# Patient Record
Sex: FEMALE | Race: BLACK OR AFRICAN AMERICAN | Employment: FULL TIME | URBAN - METROPOLITAN AREA
[De-identification: names, ages, dates, MRNs, and addresses within clinical notes are randomized per-mention and may not be internally consistent; named-entity substitution may affect disease eponyms.]

---

## 2023-06-23 ENCOUNTER — HOSPITAL ENCOUNTER (EMERGENCY)
Age: 23
Discharge: HOME | End: 2023-06-23
Attending: EMERGENCY MEDICINE
Payer: COMMERCIAL

## 2023-06-23 VITALS
OXYGEN SATURATION: 98 % | HEIGHT: 65 IN | RESPIRATION RATE: 16 BRPM | WEIGHT: 125 LBS | TEMPERATURE: 97.4 F | SYSTOLIC BLOOD PRESSURE: 123 MMHG | HEART RATE: 96 BPM | BODY MASS INDEX: 20.83 KG/M2 | DIASTOLIC BLOOD PRESSURE: 65 MMHG

## 2023-06-23 DIAGNOSIS — J01.00 ACUTE NON-RECURRENT MAXILLARY SINUSITIS: Primary | ICD-10-CM

## 2023-06-23 PROCEDURE — 99283 EMERGENCY DEPT VISIT LOW MDM: CPT

## 2023-06-23 RX ORDER — AMOXICILLIN AND CLAVULANATE POTASSIUM 875; 125 MG/1; MG/1
1 TABLET, FILM COATED ORAL 2 TIMES DAILY
Qty: 14 TABLET | Refills: 0 | Status: SHIPPED | OUTPATIENT
Start: 2023-06-23 | End: 2023-06-30

## 2023-06-23 ASSESSMENT — PAIN - FUNCTIONAL ASSESSMENT: PAIN_FUNCTIONAL_ASSESSMENT: NONE - DENIES PAIN

## 2023-06-23 NOTE — ED PROVIDER NOTES
Emergency Department Provider Note       PCP: No primary care provider on file. Age: 20 y.o. Sex: female     DISPOSITION Decision To Discharge 06/23/2023 03:09:30 PM       ICD-10-CM    1. Acute non-recurrent maxillary sinusitis  J01.00           Medical Decision Making     Complexity of Problems Addressed:  Complexity of Problem: 1 acute, uncomplicated illness or injury. Data Reviewed and Analyzed:  Category 1:   I independently ordered and reviewed each unique test.         Category 2:       Category 3: Discussion of management or test interpretation. Overall well-appearing 69-year-old female presents emergency department today with complaint of a sinus infection. Patient appears in no acute distress. Maxillary sinus tenderness on exam but otherwise unremarkable exam.  Patient was unsure if she was pregnant. Urine pregnancy is negative. We will treat with Augmentin. Return precautions discussed. Risk of Complications and/or Morbidity of Patient Management:  Prescription drug management performed and Shared medical decision making was utilized in creating the patients health plan today. History     Angeline Wagner is a 20 y.o. female who presents to the Emergency Department with chief complaint of    Chief Complaint   Patient presents with    Sinus Problem      69-year-old female presents emergency department today with complaint of sinus infection. Patient reports that she began with congestion about 8 days ago. Symptoms have progressively worsened. She denies any fever, sore throat, cough, or ear pain. She has been taking Zyrtec and Claritin without relief. She reports that she does get sinus infections once or twice a year. She denies any recent antibiotic use. The history is provided by the patient. Physical Exam     Vitals signs and nursing note reviewed.    Vitals:    06/23/23 1458   BP: 123/65   Pulse: 96   Resp: 16   Temp: 97.4 °F (36.3 °C)   TempSrc: Oral   SpO2: 98%

## 2023-11-28 ENCOUNTER — HOSPITAL ENCOUNTER (EMERGENCY)
Age: 23
Discharge: HOME | End: 2023-11-28
Attending: EMERGENCY MEDICINE
Payer: COMMERCIAL

## 2023-11-28 VITALS
DIASTOLIC BLOOD PRESSURE: 71 MMHG | RESPIRATION RATE: 16 BRPM | OXYGEN SATURATION: 100 % | HEART RATE: 97 BPM | TEMPERATURE: 98.9 F | SYSTOLIC BLOOD PRESSURE: 110 MMHG

## 2023-11-28 DIAGNOSIS — Z00.00 NORMAL EXAM: Primary | ICD-10-CM

## 2023-11-28 PROCEDURE — 99282 EMERGENCY DEPT VISIT SF MDM: CPT

## 2023-11-28 ASSESSMENT — LIFESTYLE VARIABLES
HOW OFTEN DO YOU HAVE A DRINK CONTAINING ALCOHOL: NEVER
HOW MANY STANDARD DRINKS CONTAINING ALCOHOL DO YOU HAVE ON A TYPICAL DAY: PATIENT DOES NOT DRINK

## 2023-11-28 NOTE — ED TRIAGE NOTES
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

## 2023-11-28 NOTE — ED PROVIDER NOTES
Emergency Department Provider Note       PCP: No primary care provider on file. Age: 24 y.o. Sex: female     DISPOSITION Decision To Discharge 11/28/2023 05:36:22 PM       ICD-10-CM    1. Normal exam  Z00.00           Medical Decision Making     Complexity of Problems Addressed:  1 or more acute illnesses that pose a threat to life or bodily function. Data Reviewed and Analyzed:  I independently ordered and reviewed each unique test.  I reviewed external records: provider visit note from PCP. Discussion of management or test interpretation. Patient is a 21-year-old female with no significant past medical history who states she had lower back pain intermittently on Sunday and she has had this ever since she had COVID a month ago however her symptoms have completely resolved and patient has no complaints currently. Patient has no chest pain or back pain or cold cough or congestion or shortness of breath. Patient is requesting a work note to return back to work. Patient has no complaints. Patient's physical exam is normal.  Patient is vital signs are stable. We will DC home with a work note to return      Risk of Complications and/or Morbidity of Patient Management:  Shared medical decision making was utilized in creating the patients health plan today. History       Patient is a 21-year-old female with no significant past medical history who states she had lower back pain intermittently on Sunday and she has had this ever since she had COVID a month ago however her symptoms have completely resolved and patient has no complaints currently. Patient has no chest pain or back pain or cold cough or congestion or shortness of breath. Patient is requesting a work note to return back to work. Patient has no complaints. Review of Systems   All other systems reviewed and are negative.       Physical Exam     Vitals signs and nursing note reviewed:  Vitals:    11/28/23 1722   BP:

## 2024-01-20 ENCOUNTER — HOSPITAL ENCOUNTER (EMERGENCY)
Age: 24
Discharge: HOME | End: 2024-01-20
Attending: EMERGENCY MEDICINE
Payer: COMMERCIAL

## 2024-01-20 VITALS
TEMPERATURE: 98.1 F | BODY MASS INDEX: 21.66 KG/M2 | DIASTOLIC BLOOD PRESSURE: 76 MMHG | RESPIRATION RATE: 12 BRPM | HEIGHT: 65 IN | OXYGEN SATURATION: 100 % | SYSTOLIC BLOOD PRESSURE: 112 MMHG | HEART RATE: 73 BPM | WEIGHT: 130 LBS

## 2024-01-20 DIAGNOSIS — R53.83 MALAISE AND FATIGUE: ICD-10-CM

## 2024-01-20 DIAGNOSIS — R53.81 MALAISE AND FATIGUE: ICD-10-CM

## 2024-01-20 DIAGNOSIS — Z75.8 DOES NOT HAVE PRIMARY CARE PROVIDER: ICD-10-CM

## 2024-01-20 DIAGNOSIS — N92.6 MENSTRUAL IRREGULARITY: ICD-10-CM

## 2024-01-20 DIAGNOSIS — D64.9 SYMPTOMATIC ANEMIA: ICD-10-CM

## 2024-01-20 DIAGNOSIS — N30.00 ACUTE CYSTITIS WITHOUT HEMATURIA: Primary | ICD-10-CM

## 2024-01-20 DIAGNOSIS — R53.1 GENERALIZED WEAKNESS: ICD-10-CM

## 2024-01-20 LAB
ANION GAP SERPL CALC-SCNC: 1 MMOL/L (ref 2–11)
APPEARANCE UR: CLEAR
BACTERIA URNS QL MICRO: ABNORMAL /HPF
BASOPHILS # BLD: 0 K/UL (ref 0–0.2)
BASOPHILS NFR BLD: 0 % (ref 0–2)
BILIRUB UR QL: NEGATIVE
BUN SERPL-MCNC: 9 MG/DL (ref 6–23)
CALCIUM SERPL-MCNC: 9.2 MG/DL (ref 8.3–10.4)
CASTS URNS QL MICRO: ABNORMAL /LPF
CHLORIDE SERPL-SCNC: 106 MMOL/L (ref 103–113)
CO2 SERPL-SCNC: 30 MMOL/L (ref 21–32)
COLOR UR: ABNORMAL
CREAT SERPL-MCNC: 0.77 MG/DL (ref 0.6–1)
DIFFERENTIAL METHOD BLD: ABNORMAL
EKG ATRIAL RATE: 81 BPM
EKG DIAGNOSIS: NORMAL
EKG P AXIS: 63 DEGREES
EKG P-R INTERVAL: 143 MS
EKG Q-T INTERVAL: 359 MS
EKG QRS DURATION: 84 MS
EKG QTC CALCULATION (BAZETT): 417 MS
EKG R AXIS: 77 DEGREES
EKG T AXIS: 46 DEGREES
EKG VENTRICULAR RATE: 81 BPM
EOSINOPHIL # BLD: 0 K/UL (ref 0–0.8)
EOSINOPHIL NFR BLD: 0 % (ref 0.5–7.8)
EPI CELLS #/AREA URNS HPF: ABNORMAL /HPF
ERYTHROCYTE [DISTWIDTH] IN BLOOD BY AUTOMATED COUNT: 18 % (ref 11.9–14.6)
GLUCOSE SERPL-MCNC: 105 MG/DL (ref 65–100)
GLUCOSE UR STRIP.AUTO-MCNC: NEGATIVE MG/DL
HCG UR QL: NEGATIVE
HCT VFR BLD AUTO: 33.1 % (ref 35.8–46.3)
HGB BLD-MCNC: 9.2 G/DL (ref 11.7–15.4)
HGB UR QL STRIP: NEGATIVE
IMM GRANULOCYTES # BLD AUTO: 0 K/UL (ref 0–0.5)
IMM GRANULOCYTES NFR BLD AUTO: 0 % (ref 0–5)
KETONES UR QL STRIP.AUTO: NEGATIVE MG/DL
LEUKOCYTE ESTERASE UR QL STRIP.AUTO: ABNORMAL
LYMPHOCYTES # BLD: 1.6 K/UL (ref 0.5–4.6)
LYMPHOCYTES NFR BLD: 24 % (ref 13–44)
MCH RBC QN AUTO: 20.6 PG (ref 26.1–32.9)
MCHC RBC AUTO-ENTMCNC: 27.8 G/DL (ref 31.4–35)
MCV RBC AUTO: 74.2 FL (ref 82–102)
MONOCYTES # BLD: 0.7 K/UL (ref 0.1–1.3)
MONOCYTES NFR BLD: 10 % (ref 4–12)
NEUTS SEG # BLD: 4.3 K/UL (ref 1.7–8.2)
NEUTS SEG NFR BLD: 65 % (ref 43–78)
NITRITE UR QL STRIP.AUTO: POSITIVE
NRBC # BLD: 0 K/UL (ref 0–0.2)
PH UR STRIP: 5.5 (ref 5–9)
PLATELET # BLD AUTO: 359 K/UL (ref 150–450)
PMV BLD AUTO: 9.7 FL (ref 9.4–12.3)
POTASSIUM SERPL-SCNC: 3.5 MMOL/L (ref 3.5–5.1)
PROT UR STRIP-MCNC: NEGATIVE MG/DL
RBC # BLD AUTO: 4.46 M/UL (ref 4.05–5.2)
RBC #/AREA URNS HPF: ABNORMAL /HPF
SODIUM SERPL-SCNC: 137 MMOL/L (ref 136–146)
SP GR UR REFRACTOMETRY: 1.02 (ref 1–1.02)
UROBILINOGEN UR QL STRIP.AUTO: 0.2 EU/DL (ref 0.2–1)
WBC # BLD AUTO: 6.6 K/UL (ref 4.3–11.1)
WBC URNS QL MICRO: ABNORMAL /HPF

## 2024-01-20 PROCEDURE — 81025 URINE PREGNANCY TEST: CPT

## 2024-01-20 PROCEDURE — 80048 BASIC METABOLIC PNL TOTAL CA: CPT

## 2024-01-20 PROCEDURE — 85025 COMPLETE CBC W/AUTO DIFF WBC: CPT

## 2024-01-20 PROCEDURE — 6370000000 HC RX 637 (ALT 250 FOR IP): Performed by: EMERGENCY MEDICINE

## 2024-01-20 PROCEDURE — 99284 EMERGENCY DEPT VISIT MOD MDM: CPT

## 2024-01-20 PROCEDURE — 93010 ELECTROCARDIOGRAM REPORT: CPT | Performed by: INTERNAL MEDICINE

## 2024-01-20 PROCEDURE — 81001 URINALYSIS AUTO W/SCOPE: CPT

## 2024-01-20 PROCEDURE — 93005 ELECTROCARDIOGRAM TRACING: CPT | Performed by: EMERGENCY MEDICINE

## 2024-01-20 RX ORDER — CEPHALEXIN 500 MG/1
500 CAPSULE ORAL 3 TIMES DAILY
Qty: 21 CAPSULE | Refills: 0 | Status: SHIPPED | OUTPATIENT
Start: 2024-01-20 | End: 2024-01-27

## 2024-01-20 RX ORDER — DROSPIRENONE AND ETHINYL ESTRADIOL 0.03MG-3MG
1 KIT ORAL DAILY
Qty: 1 PACKET | Refills: 3 | Status: SHIPPED | OUTPATIENT
Start: 2024-01-20 | End: 2024-01-20

## 2024-01-20 RX ORDER — CEPHALEXIN 500 MG/1
500 CAPSULE ORAL
Status: COMPLETED | OUTPATIENT
Start: 2024-01-20 | End: 2024-01-20

## 2024-01-20 RX ORDER — DROSPIRENONE AND ETHINYL ESTRADIOL 0.03MG-3MG
1 KIT ORAL DAILY
Qty: 1 PACKET | Refills: 5 | Status: SHIPPED | OUTPATIENT
Start: 2024-01-20

## 2024-01-20 RX ADMIN — CEPHALEXIN 500 MG: 500 CAPSULE ORAL at 10:40

## 2024-01-20 ASSESSMENT — PAIN - FUNCTIONAL ASSESSMENT: PAIN_FUNCTIONAL_ASSESSMENT: NONE - DENIES PAIN

## 2024-01-20 NOTE — DISCHARGE INSTRUCTIONS
We recommend continuing iron supplementation, follow-up with OB/GYN care provider as well as a primary care provider.  Please take medications for UTI and hydrate well.  Starting birth control tablets will likely improve menstrual irregularity but may take up to 3 months to do so.  You may initially see increased irregularity or abnormal bleeding symptoms.

## 2024-01-20 NOTE — ED NOTES
I have reviewed discharge instructions with the patient.  The patient verbalized understanding.    Patient left ED via Discharge Method: ambulatory to Home with (self).    Opportunity for questions and clarification provided.       Patient given 2 scripts.         To continue your aftercare when you leave the hospital, you may receive an automated call from our care team to check in on how you are doing.  This is a free service and part of our promise to provide the best care and service to meet your aftercare needs.” If you have questions, or wish to unsubscribe from this service please call [phone].  Thank you for Choosing our [facility] Emergency Department.

## 2024-01-20 NOTE — ED PROVIDER NOTES
Emergency Department Provider Note                   PCP:                No primary care provider on file.               Age: 23 y.o.      Sex: female   Final diagnosis/impression:  1. Acute cystitis without hematuria    2. Symptomatic anemia    3. Generalized weakness    4. Malaise and fatigue    5. Does not have primary care provider    6. Menstrual irregularity       Disposition: Discharged    MDM/Clinical Course:  Patient seen by myself at the [facility] emergency department. Patient had signs symptoms and clinical history most consistent with ongoing symptoms that include lightheadedness, low energy, fatigue, not feeling rested. My independent analysis/interpretation of laboratory work-up here shows CBC shows decreased hemoglobin at 9.2 with MCV of 74.2 suspect iron deficiency related and likely associated with menstrual cycle.  BMP is unremarkable though with some borderline potassium at 3.5.  Urinalysis shows nitrate positive urine with elevated urine white blood cells, 3+ urine bacteria all consistent with UTI, urine pregnancy is patient family denies pertinent patient surge history.  Patient received one-time dose of 1 g p.o. Keflex and was written a prescription for Keflex 3 times daily x 7 days.  Overall suspect patient presentation is multifactorial including heavy work demands, anemia, UTI, sleep hygiene related.  Recommended continuation of iron supplementation which patient states she just restarted, offered to write the patient a prescription to begin birth control which the patient accepted.  Discussed need to use alternative methods of birth control while transitioning onto OCP use.  Outpatient referral to GYN created.  Recommended continuation of iron supplements, hydration, attempt to increase sleep quality and quantity, follow-up with primary care provider and GYN.  Patient/family given instructions to return as needed for any questions, concerns or worsening symptoms, particularly

## 2024-02-14 ENCOUNTER — APPOINTMENT (OUTPATIENT)
Dept: ULTRASOUND IMAGING | Age: 24
End: 2024-02-14
Payer: COMMERCIAL

## 2024-02-14 ENCOUNTER — HOSPITAL ENCOUNTER (EMERGENCY)
Age: 24
Discharge: HOME | End: 2024-02-14
Payer: COMMERCIAL

## 2024-02-14 VITALS
RESPIRATION RATE: 16 BRPM | HEART RATE: 84 BPM | DIASTOLIC BLOOD PRESSURE: 75 MMHG | HEIGHT: 65 IN | WEIGHT: 130 LBS | SYSTOLIC BLOOD PRESSURE: 108 MMHG | BODY MASS INDEX: 21.66 KG/M2 | TEMPERATURE: 97.5 F | OXYGEN SATURATION: 99 %

## 2024-02-14 DIAGNOSIS — D64.9 ANEMIA, UNSPECIFIED TYPE: ICD-10-CM

## 2024-02-14 DIAGNOSIS — N93.9 VAGINAL BLEEDING: Primary | ICD-10-CM

## 2024-02-14 DIAGNOSIS — D72.829 LEUKOCYTOSIS, UNSPECIFIED TYPE: ICD-10-CM

## 2024-02-14 LAB
ANION GAP SERPL CALC-SCNC: 2 MMOL/L (ref 2–11)
BASOPHILS # BLD: 0 K/UL (ref 0–0.2)
BASOPHILS NFR BLD: 0 % (ref 0–2)
BUN SERPL-MCNC: 8 MG/DL (ref 6–23)
CALCIUM SERPL-MCNC: 9.2 MG/DL (ref 8.3–10.4)
CHLORIDE SERPL-SCNC: 108 MMOL/L (ref 103–113)
CO2 SERPL-SCNC: 29 MMOL/L (ref 21–32)
CREAT SERPL-MCNC: 0.7 MG/DL (ref 0.6–1)
DIFFERENTIAL METHOD BLD: ABNORMAL
EOSINOPHIL # BLD: 0 K/UL (ref 0–0.8)
EOSINOPHIL NFR BLD: 0 % (ref 0.5–7.8)
ERYTHROCYTE [DISTWIDTH] IN BLOOD BY AUTOMATED COUNT: 17.1 % (ref 11.9–14.6)
GLUCOSE SERPL-MCNC: 95 MG/DL (ref 65–100)
HCG SERPL QL: NEGATIVE
HCT VFR BLD AUTO: 27.6 % (ref 35.8–46.3)
HGB BLD-MCNC: 7.8 G/DL (ref 11.7–15.4)
IMM GRANULOCYTES # BLD AUTO: 0.1 K/UL (ref 0–0.5)
IMM GRANULOCYTES NFR BLD AUTO: 1 % (ref 0–5)
LYMPHOCYTES # BLD: 1.3 K/UL (ref 0.5–4.6)
LYMPHOCYTES NFR BLD: 11 % (ref 13–44)
MCH RBC QN AUTO: 20.8 PG (ref 26.1–32.9)
MCHC RBC AUTO-ENTMCNC: 28.3 G/DL (ref 31.4–35)
MCV RBC AUTO: 73.6 FL (ref 82–102)
MONOCYTES # BLD: 1 K/UL (ref 0.1–1.3)
MONOCYTES NFR BLD: 8 % (ref 4–12)
NEUTS SEG # BLD: 9.5 K/UL (ref 1.7–8.2)
NEUTS SEG NFR BLD: 80 % (ref 43–78)
NRBC # BLD: 0 K/UL (ref 0–0.2)
PLATELET # BLD AUTO: 357 K/UL (ref 150–450)
PMV BLD AUTO: 9.9 FL (ref 9.4–12.3)
POTASSIUM SERPL-SCNC: 3.8 MMOL/L (ref 3.5–5.1)
RBC # BLD AUTO: 3.75 M/UL (ref 4.05–5.2)
SODIUM SERPL-SCNC: 139 MMOL/L (ref 136–146)
WBC # BLD AUTO: 11.9 K/UL (ref 4.3–11.1)

## 2024-02-14 PROCEDURE — 80048 BASIC METABOLIC PNL TOTAL CA: CPT

## 2024-02-14 PROCEDURE — 2580000003 HC RX 258: Performed by: NURSE PRACTITIONER

## 2024-02-14 PROCEDURE — 84703 CHORIONIC GONADOTROPIN ASSAY: CPT

## 2024-02-14 PROCEDURE — 76830 TRANSVAGINAL US NON-OB: CPT

## 2024-02-14 PROCEDURE — 85025 COMPLETE CBC W/AUTO DIFF WBC: CPT

## 2024-02-14 PROCEDURE — 96374 THER/PROPH/DIAG INJ IV PUSH: CPT

## 2024-02-14 PROCEDURE — 6360000002 HC RX W HCPCS: Performed by: NURSE PRACTITIONER

## 2024-02-14 PROCEDURE — 99284 EMERGENCY DEPT VISIT MOD MDM: CPT

## 2024-02-14 RX ORDER — KETOROLAC TROMETHAMINE 15 MG/ML
15 INJECTION, SOLUTION INTRAMUSCULAR; INTRAVENOUS ONCE
Status: COMPLETED | OUTPATIENT
Start: 2024-02-14 | End: 2024-02-14

## 2024-02-14 RX ORDER — 0.9 % SODIUM CHLORIDE 0.9 %
1000 INTRAVENOUS SOLUTION INTRAVENOUS
Status: COMPLETED | OUTPATIENT
Start: 2024-02-14 | End: 2024-02-14

## 2024-02-14 RX ADMIN — KETOROLAC TROMETHAMINE 15 MG: 15 INJECTION, SOLUTION INTRAMUSCULAR; INTRAVENOUS at 12:03

## 2024-02-14 RX ADMIN — SODIUM CHLORIDE 1000 ML: 9 INJECTION, SOLUTION INTRAVENOUS at 12:04

## 2024-02-14 NOTE — ED PROVIDER NOTES
packet directions., Disp-1 packet, R-[id number]              Results for orders placed or performed during the hospital encounter of 02/14/24   US PELVIS COMPLETE NON-OB TRANSABDOMINAL AND TRANSVAGINAL    Narrative    PELVIC ULTRASOUND.    HISTORY: Pelvic bleeding    TECHNIQUE:  Transabdominal and endovaginal  images were obtained of the pelvis.    COMPARISON:  None    FINDINGS:    - UTERUS: 8.2 x 3.6 x 5.3 cm in length.  Endometrial stripe measures 5.2 mm.  No  discrete myometrial or endometrial mass is seen.    -  RIGHT OVARY: 3.2 x 1.9 x 2.3 cm.  No significant adnexal mass.  Normal blood  flow.    -  LEFT OVARY: 3.1 x 2.2 x 2.3 cm.  No significant adnexal mass.  Normal blood  flow.    No significant free fluid.      Impression    No acute findings in the pelvis.          CBC with Auto Differential   Result Value Ref Range    WBC 11.9 (H) 4.3 - 11.1 K/uL    RBC 3.75 (L) 4.05 - 5.2 M/uL    Hemoglobin 7.8 (L) 11.7 - 15.4 g/dL    Hematocrit 27.6 (L) 35.8 - 46.3 %    MCV 73.6 (L) 82.0 - 102.0 FL    MCH 20.8 (L) 26.1 - 32.9 PG    MCHC 28.3 (L) 31.4 - 35.0 g/dL    RDW 17.1 (H) 11.9 - 14.6 %    Platelets 357 150 - 450 K/uL    MPV 9.9 9.4 - 12.3 FL    nRBC 0.00 0.0 - 0.2 K/uL    Differential Type AUTOMATED      Neutrophils % 80 (H) 43 - 78 %    Lymphocytes % 11 (L) 13 - 44 %    Monocytes % 8 4.0 - 12.0 %    Eosinophils % 0 (L) 0.5 - 7.8 %    Basophils % 0 0.0 - 2.0 %    Immature Granulocytes 1 0.0 - 5.0 %    Neutrophils Absolute 9.5 (H) 1.7 - 8.2 K/UL    Lymphocytes Absolute 1.3 0.5 - 4.6 K/UL    Monocytes Absolute 1.0 0.1 - 1.3 K/UL    Eosinophils Absolute 0.0 0.0 - 0.8 K/UL    Basophils Absolute 0.0 0.0 - 0.2 K/UL    Absolute Immature Granulocyte 0.1 0.0 - 0.5 K/UL   BMP   Result Value Ref Range    Sodium 139 136 - 146 mmol/L    Potassium 3.8 3.5 - 5.1 mmol/L    Chloride 108 103 - 113 mmol/L    CO2 29 21 - 32 mmol/L    Anion Gap 2 2 - 11 mmol/L    Glucose 95 65 - 100 mg/dL    BUN 8 6 - 23 MG/DL    Creatinine 0.70 0.6 - 1.0

## 2024-02-14 NOTE — ED NOTES
I have reviewed discharge instructions with the patient.  The patient verbalized understanding.    Patient left ED via Discharge Method: ambulatory to Home with self.    Opportunity for questions and clarification provided.       Patient given 0 scripts.         To continue your aftercare when you leave the hospital, you may receive an automated call from our care team to check in on how you are doing.  This is a free service and part of our promise to provide the best care and service to meet your aftercare needs.” If you have questions, or wish to unsubscribe from this service please call [phone].  Thank you for Choosing our [facility] Emergency Department.

## 2024-02-14 NOTE — ED TRIAGE NOTES
Pt ambulatory with c/o low abd pain/cramping and vaginal bleeding with clots since starting an oral birth control liv 1 month ago.

## 2024-02-14 NOTE — ED NOTES
I have reviewed discharge instructions with the patient.  The patient verbalized understanding.    Patient left ED via Discharge Method: ambulatory to Home with self.    Opportunity for questions and clarification provided.       Patient given 0 scripts.         To continue your aftercare when you leave the hospital, you may receive an automated call from our care team to check in on how you are doing.  This is a free service and part of our promise to provide the best care and service to meet your aftercare needs.” If you have questions, or wish to unsubscribe from this service please call [phone].  Thank you for Choosing our [facility] Emergency Department.

## 2024-02-14 NOTE — DISCHARGE INSTRUCTIONS
As we discussed, your hemoglobin is low.  Make sure you take the iron supplements you have and have your hemoglobin rechecked.  Your white blood cell count is also elevated.  If you develop any fevers or signs of infection, please return to the emergency department immediately for evaluation.  I would recommend that you have this rechecked as well.  Continue birth control pills as those were prescribed.  Follow-up with gynecology.  Return to the emergency department for any new, worsening, or concerning symptoms.